# Patient Record
(demographics unavailable — no encounter records)

---

## 2024-12-12 NOTE — HISTORY OF PRESENT ILLNESS
[de-identified] : Mr. Leigh is a 55 year old male with a past medical history of HTN and HLD that presents for initial consultation for recent unprovoked R sided DVT on Xarelto 20mg.   Patient notes he developed R calf pain in 9/2022 and underwent venous duplex at [facility] on 9/17/22 revealing acute deep vein thrombosis of one right peroneal vein. Patient was started on Xarelto 20mg. Seen and evaluated by vascular sugery Dr. Neumann on 10/3/22 who recommended at least 3 months of anticoagulation. He was later seen in follow up on 12/5/22 and lower venous duplex preformed in office revealed all deep and superficial veins of the lower extremity are compressible and patent. B mode image and Doppler analysis of femoral, popliteal, tibioperoneal, and saphenous veins are within normal limits. No evidence of deep vein thrombosis or superficial phlebitis at this time. He was advised to continue anticoagulation being he did not yet have thrombophilia work up.   MTHFR compound heterozygous + Silica clotting time + 1.33 DRVVT neg Factor VIII wnl homocysteine wnl Prothrombin gene mutation - not detected   He reports this is the first time he had a blood clot. He reports daily  headaches, intermittent vertigo, severe reflux; denies bleeding  3 children alive and well  benign tumor removed from L4 2006, benign  Currently employed as    [de-identified] : Patient seen and examined today for routine follow up for the management of hx of DVT. He remains on Xaretlo 20mg daily. Tolerating well. No obvious bleeding. He continues to use compression stockings but feels they are not the right size. He did have knee pain s/p imaging and s/p cortisone and gel injections was told he will likely need surgery, nervous with hx of blood clots. He has not gone for CNY. He was started on Metformin for prediabetes by PCP and was inquiring about shot for weight loss and prediabtets. Previously went to weight management and was offered surgery and he does not want tot do this. +neuropathy to bilateral hands was also told he has carpel tunnel and going to see neuro. Denies fevers/chills, HA, dizziness, SOB, cough, CP, palpitations, abd pain, n/v/d, swelling to extremities, back pain, hematuria, BRBPR, abnormal bleeding.

## 2024-12-12 NOTE — ASSESSMENT
[FreeTextEntry1] : Mr. Leigh is a 56 year old male with a history of unprovoked DVT MTHFR heterozygous and FVL heterozygous that presents for routine follow up on Xarelto 20mg daily.   #DVT - Unprovoked DVT - Started on AC x 3 months - Remains on Xarelto - MTHFR compound heterozygous + - Silica clotting time + 1.33 - DRVVT neg - Factor VIII wnl - Homocysteine wnl - Protein c and s wnl - Prothrombin gene mutation - not detected - He underwent a venous duplex 12/5/22 which demonstrated resolution of the DVT. - Factor V leiden heterozygous which increases risk for venous thrombosis, MTHFR compound heterozygous +, slightly elevated phosphatidyl serine ab, LA +, slightly elevated cardiolipin. At this point would recommend continuing xarelto 20 mg for now. Can consider lowering to prophylactic dose however I would continue AC given these risk factors. - 8/11/23 vs and CBC reviewed; WBC 5.21, hgb 13.0, plt 230. Add anemia panel today. Continue Xarelto given risk factors. May consider ppx dose in future. For dental work or CNY will need to hold 24hour before and after. To notify office if he has planned dental work or CNY. Advised importance of compression stockings on long flights or car rides, ambulation and getting up on long carrides or flights. He is on his feet at work 16h/day. Recommend elevation of legs after work and wearing compression stockings at work. Recommend follow up with vascular Dr. Neumann as instructed. Reviewed s/s of thrombosis. - 4/24/24 vs and CBC reviewed; WBC 5.09, hgb 13.2, plt 227 d dimer wnl. Remains on Xarelto 20. Discussed lowering to ppx but rec staying on AC due to risk factors and gene mutation. He wishes to stay on 20 for now until he finishes with procedures and then after may consider going to ppx dose. Reviewed s/s of thrombosis as above and recommendations when travelling  - 12/2024 continue Xarelto 20mg daily. Does not want to lower, compression stockings ordered   #Anemia  - 8/11/23 hgb 13.0. Add anemia panel to work up today. Advised colorectal screening. Given list of [facility] providers to schedule CNY  - 4/24/24 hgb 13.2. Reviewed anemia work up from last visit +IgG lambda band. Will complete rest of MGUS work up today. Reminded about CNY has not done and states he will do. Gave names for GI specialists again  - 12/2024 stable reminded about CNY   #MGUS  - #MGUS  -We have discussed the diagnosis of Monoclonal Gammopathy. -We have reviewed the labs personally and with the patient. He does have slight anemia no evidence of anemia, hypercalcemia or renal failure from 1/23. -We discussed the criteria for MGUS and that it is a clinically asymptomatic premalignant clonal plasma cell or lymphoplasmacytic proliferative disorder however given findings we do need to rule out Multiple myeloma. -We discussed that Each year about 1% of people with MGUS go on to develop certain types of hematologic malignancies such as Multiple myeloma, Light chain amyloidosis, Waldenstrom macroglobulinemia or Lymphoma. -Given findings of monoclonal gammopathy will check Complete blood count, CMP, SPEP with ULYSSES, UPEP with ULYSSES, Urine for Bence Lucero proteins, sFLC, Quantitative Immunoglobulins, B2 microglobulin, LDH - We did discuss that although a bone marrow evaluation is indicated for all patients with MM at diagnosis, it may be deferred for persons that are clinically suspected of having MGUS with a small M-protein (less than 1.5 g/100 mL), minimal or no abnormalities in serum FLCs, and no end-organ damage. - Monitor labs q3mos M spike 0.2 last visit K:L wnl   #Neuropathy  - Seeing neuro  - Check immumoglobulins as above  - Check B12   #HTN - On antihypertensives managed by PCP   #Prediabetes/obesity - Recommend weight loss with diet and exercise - Referred to weight management team  - Patient states he saw weight management, notes reviewed. Does not want surgery  - Was started on Metformin by PCP and inquiring about injections like wegovy/ozempic. Referred to Endo   #HLD  - Remains on statin  RTC in 4-6 mos with CBC with diff, CMP, d dimer, MGUS labs and urines, b12   Case and managment discussed with Dr. Shah

## 2024-12-12 NOTE — ASSESSMENT
[FreeTextEntry1] : Mr. Leigh is a 56 year old male with a history of unprovoked DVT MTHFR heterozygous and FVL heterozygous that presents for routine follow up on Xarelto 20mg daily.   #DVT - Unprovoked DVT - Started on AC x 3 months - Remains on Xarelto - MTHFR compound heterozygous + - Silica clotting time + 1.33 - DRVVT neg - Factor VIII wnl - Homocysteine wnl - Protein c and s wnl - Prothrombin gene mutation - not detected - He underwent a venous duplex 12/5/22 which demonstrated resolution of the DVT. - Factor V leiden heterozygous which increases risk for venous thrombosis, MTHFR compound heterozygous +, slightly elevated phosphatidyl serine ab, LA +, slightly elevated cardiolipin. At this point would recommend continuing xarelto 20 mg for now. Can consider lowering to prophylactic dose however I would continue AC given these risk factors. - 8/11/23 vs and CBC reviewed; WBC 5.21, hgb 13.0, plt 230. Add anemia panel today. Continue Xarelto given risk factors. May consider ppx dose in future. For dental work or CNY will need to hold 24hour before and after. To notify office if he has planned dental work or CNY. Advised importance of compression stockings on long flights or car rides, ambulation and getting up on long carrides or flights. He is on his feet at work 16h/day. Recommend elevation of legs after work and wearing compression stockings at work. Recommend follow up with vascular Dr. Neumann as instructed. Reviewed s/s of thrombosis. - 4/24/24 vs and CBC reviewed; WBC 5.09, hgb 13.2, plt 227 d dimer wnl. Remains on Xarelto 20. Discussed lowering to ppx but rec staying on AC due to risk factors and gene mutation. He wishes to stay on 20 for now until he finishes with procedures and then after may consider going to ppx dose. Reviewed s/s of thrombosis as above and recommendations when travelling  - 12/2024 continue Xarelto 20mg daily. Does not want to lower, compression stockings ordered   #Anemia  - 8/11/23 hgb 13.0. Add anemia panel to work up today. Advised colorectal screening. Given list of [facility] providers to schedule CNY  - 4/24/24 hgb 13.2. Reviewed anemia work up from last visit +IgG lambda band. Will complete rest of MGUS work up today. Reminded about CNY has not done and states he will do. Gave names for GI specialists again  - 12/2024 stable reminded about CNY   #MGUS  - #MGUS  -We have discussed the diagnosis of Monoclonal Gammopathy. -We have reviewed the labs personally and with the patient. He does have slight anemia no evidence of anemia, hypercalcemia or renal failure from 1/23. -We discussed the criteria for MGUS and that it is a clinically asymptomatic premalignant clonal plasma cell or lymphoplasmacytic proliferative disorder however given findings we do need to rule out Multiple myeloma. -We discussed that Each year about 1% of people with MGUS go on to develop certain types of hematologic malignancies such as Multiple myeloma, Light chain amyloidosis, Waldenstrom macroglobulinemia or Lymphoma. -Given findings of monoclonal gammopathy will check Complete blood count, CMP, SPEP with ULYSSES, UPEP with ULYSSES, Urine for Bence Lucero proteins, sFLC, Quantitative Immunoglobulins, B2 microglobulin, LDH - We did discuss that although a bone marrow evaluation is indicated for all patients with MM at diagnosis, it may be deferred for persons that are clinically suspected of having MGUS with a small M-protein (less than 1.5 g/100 mL), minimal or no abnormalities in serum FLCs, and no end-organ damage. - Monitor labs q3mos M spike 0.2 last visit K:L wnl   #Neuropathy  - Seeing neuro  - Check immumoglobulins as above  - Check B12   #HTN - On antihypertensives managed by PCP   #Prediabetes/obesity - Recommend weight loss with diet and exercise - Referred to weight management team  - Patient states he saw weight management, notes reviewed. Does not want surgery  - Was started on Metformin by PCP and inquiring about injections like wegovy/ozempic. Referred to Endo   #HLD  - Remains on statin  RTC in 4-6 mos with CBC with diff, CMP, d dimer, MGUS labs and urines, b12   Case and managment discussed with Dr. Shah

## 2024-12-12 NOTE — PHYSICAL EXAM
[de-identified] : sensation intact, no swelling to lower extremities bilaterally, no calf tenderness

## 2024-12-12 NOTE — PHYSICAL EXAM
[de-identified] : sensation intact, no swelling to lower extremities bilaterally, no calf tenderness

## 2024-12-12 NOTE — HISTORY OF PRESENT ILLNESS
[de-identified] : Mr. Leigh is a 55 year old male with a past medical history of HTN and HLD that presents for initial consultation for recent unprovoked R sided DVT on Xarelto 20mg.   Patient notes he developed R calf pain in 9/2022 and underwent venous duplex at [facility] on 9/17/22 revealing acute deep vein thrombosis of one right peroneal vein. Patient was started on Xarelto 20mg. Seen and evaluated by vascular sugery Dr. Neumann on 10/3/22 who recommended at least 3 months of anticoagulation. He was later seen in follow up on 12/5/22 and lower venous duplex preformed in office revealed all deep and superficial veins of the lower extremity are compressible and patent. B mode image and Doppler analysis of femoral, popliteal, tibioperoneal, and saphenous veins are within normal limits. No evidence of deep vein thrombosis or superficial phlebitis at this time. He was advised to continue anticoagulation being he did not yet have thrombophilia work up.   MTHFR compound heterozygous + Silica clotting time + 1.33 DRVVT neg Factor VIII wnl homocysteine wnl Prothrombin gene mutation - not detected   He reports this is the first time he had a blood clot. He reports daily  headaches, intermittent vertigo, severe reflux; denies bleeding  3 children alive and well  benign tumor removed from L4 2006, benign  Currently employed as    [de-identified] : Patient seen and examined today for routine follow up for the management of hx of DVT. He remains on Xaretlo 20mg daily. Tolerating well. No obvious bleeding. He continues to use compression stockings but feels they are not the right size. He did have knee pain s/p imaging and s/p cortisone and gel injections was told he will likely need surgery, nervous with hx of blood clots. He has not gone for CNY. He was started on Metformin for prediabetes by PCP and was inquiring about shot for weight loss and prediabtets. Previously went to weight management and was offered surgery and he does not want tot do this. +neuropathy to bilateral hands was also told he has carpel tunnel and going to see neuro. Denies fevers/chills, HA, dizziness, SOB, cough, CP, palpitations, abd pain, n/v/d, swelling to extremities, back pain, hematuria, BRBPR, abnormal bleeding.

## 2025-04-24 NOTE — HISTORY OF PRESENT ILLNESS
[de-identified] : Mr. Leigh is a 55 year old male with a past medical history of HTN and HLD that presents for initial consultation for recent unprovoked R sided DVT on Xarelto 20mg.   Patient notes he developed R calf pain in 9/2022 and underwent venous duplex at [facility] on 9/17/22 revealing acute deep vein thrombosis of one right peroneal vein. Patient was started on Xarelto 20mg. Seen and evaluated by vascular sugery Dr. Neumann on 10/3/22 who recommended at least 3 months of anticoagulation. He was later seen in follow up on 12/5/22 and lower venous duplex preformed in office revealed all deep and superficial veins of the lower extremity are compressible and patent. B mode image and Doppler analysis of femoral, popliteal, tibioperoneal, and saphenous veins are within normal limits. No evidence of deep vein thrombosis or superficial phlebitis at this time. He was advised to continue anticoagulation being he did not yet have thrombophilia work up.   MTHFR compound heterozygous + Silica clotting time + 1.33 DRVVT neg Factor VIII wnl homocysteine wnl Prothrombin gene mutation - not detected   He reports this is the first time he had a blood clot. He reports daily  headaches, intermittent vertigo, severe reflux; denies bleeding  3 children alive and well  benign tumor removed from L4 2006, benign  Currently employed as    [de-identified] : Patient seen and examined today for routine follow up for the management of hx of DVT. He remains on Xaretlo 20mg daily. Tolerating well. No obvious bleeding. He continues to use compression stockings but feels they are not the right size. He did have knee pain s/p imaging and s/p cortisone and gel injections was told he will likely need surgery, nervous with hx of blood clots. He has not gone for CNY. He was started on Metformin for prediabetes by PCP and was inquiring about shot for weight loss and prediabtets. Previously went to weight management and was offered surgery and he does not want tot do this. +neuropathy to bilateral hands was also told he has carpel tunnel and going to see neuro. Denies fevers/chills, HA, dizziness, SOB, cough, CP, palpitations, abd pain, n/v/d, swelling to extremities, back pain, hematuria, BRBPR, abnormal bleeding.

## 2025-04-24 NOTE — PHYSICAL EXAM
[Fully active, able to carry on all pre-disease performance without restriction] : Status 0 - Fully active, able to carry on all pre-disease performance without restriction [Normal] : affect appropriate [de-identified] : sensation intact, no swelling to lower extremities bilaterally, no calf tenderness

## 2025-04-24 NOTE — PHYSICAL EXAM
[Fully active, able to carry on all pre-disease performance without restriction] : Status 0 - Fully active, able to carry on all pre-disease performance without restriction [Normal] : affect appropriate [de-identified] : sensation intact, no swelling to lower extremities bilaterally, no calf tenderness

## 2025-04-24 NOTE — ASSESSMENT
[FreeTextEntry1] : Mr. Leigh is a 56 year old male with a history of unprovoked DVT MTHFR heterozygous and FVL heterozygous that presents for routine follow up on Xarelto 20mg daily.   #DVT - Unprovoked DVT - Started on AC x 3 months - Remains on Xarelto - MTHFR compound heterozygous + - Silica clotting time + 1.33 - DRVVT neg - Factor VIII wnl - Homocysteine wnl - Protein c and s wnl - Prothrombin gene mutation - not detected - He underwent a venous duplex 12/5/22 which demonstrated resolution of the DVT. - Factor V leiden heterozygous which increases risk for venous thrombosis, MTHFR compound heterozygous +, slightly elevated phosphatidyl serine ab, LA +, slightly elevated cardiolipin. At this point would recommend continuing xarelto 20 mg for now. Can consider lowering to prophylactic dose however I would continue AC given these risk factors. - 8/11/23 vs and CBC reviewed; WBC 5.21, hgb 13.0, plt 230. Add anemia panel today. Continue Xarelto given risk factors. May consider ppx dose in future. For dental work or CNY will need to hold 24hour before and after. To notify office if he has planned dental work or CNY. Advised importance of compression stockings on long flights or car rides, ambulation and getting up on long carrides or flights. He is on his feet at work 16h/day. Recommend elevation of legs after work and wearing compression stockings at work. Recommend follow up with vascular Dr. Neumann as instructed. Reviewed s/s of thrombosis. - 4/24/24 vs and CBC reviewed; WBC 5.09, hgb 13.2, plt 227 d dimer wnl. Remains on Xarelto 20. Discussed lowering to ppx but rec staying on AC due to risk factors and gene mutation. He wishes to stay on 20 for now until he finishes with procedures and then after may consider going to ppx dose. Reviewed s/s of thrombosis as above and recommendations when travelling  - 12/2024 continue Xarelto 20mg daily. Does not want to lower, compression stockings ordered   #Anemia  - 8/11/23 hgb 13.0. Add anemia panel to work up today. Advised colorectal screening. Given list of [facility] providers to schedule CNY  - 4/24/24 hgb 13.2. Reviewed anemia work up from last visit +IgG lambda band. Will complete rest of MGUS work up today. Reminded about CNY has not done and states he will do. Gave names for GI specialists again  - 12/2024 stable reminded about CNY   #MGUS  - #MGUS  -We have discussed the diagnosis of Monoclonal Gammopathy. -We have reviewed the labs personally and with the patient. He does have slight anemia no evidence of anemia, hypercalcemia or renal failure from 1/23. -We discussed the criteria for MGUS and that it is a clinically asymptomatic premalignant clonal plasma cell or lymphoplasmacytic proliferative disorder however given findings we do need to rule out Multiple myeloma. -We discussed that Each year about 1% of people with MGUS go on to develop certain types of hematologic malignancies such as Multiple myeloma, Light chain amyloidosis, Waldenstrom macroglobulinemia or Lymphoma. -Given findings of monoclonal gammopathy will check Complete blood count, CMP, SPEP with ULYSSES, UPEP with ULYSSES, Urine for Bence Lucero proteins, sFLC, Quantitative Immunoglobulins, B2 microglobulin, LDH - We did discuss that although a bone marrow evaluation is indicated for all patients with MM at diagnosis, it may be deferred for persons that are clinically suspected of having MGUS with a small M-protein (less than 1.5 g/100 mL), minimal or no abnormalities in serum FLCs, and no end-organ damage. - Monitor labs q3mos M spike 0.2 last visit K:L wnl   #Neuropathy  - Seeing neuro  - Check immumoglobulins as above  - Check B12   #HTN - On antihypertensives managed by PCP   #Prediabetes/obesity - Recommend weight loss with diet and exercise - Referred to weight management team  - Patient states he saw weight management, notes reviewed. Does not want surgery  - Was started on Metformin by PCP and inquiring about injections like wegovy/ozempic. Referred to Endo   #HLD  - Remains on statin  RTC in 4-6 mos with CBC with diff, CMP, d dimer, MGUS labs and urines, b12   Case and managment discussed with Dr. Shah

## 2025-04-24 NOTE — ASSESSMENT
[FreeTextEntry1] : Mr. Leigh is a 56 year old male with a history of unprovoked DVT MTHFR heterozygous and FVL heterozygous that presents for routine follow up on Xarelto 20mg daily.   #DVT - Unprovoked DVT - Started on AC x 3 months - Remains on Xarelto - MTHFR compound heterozygous + - Silica clotting time + 1.33 - DRVVT neg - Factor VIII wnl - Homocysteine wnl - Protein c and s wnl - Prothrombin gene mutation - not detected - He underwent a venous duplex 12/5/22 which demonstrated resolution of the DVT. - Factor V leiden heterozygous which increases risk for venous thrombosis, MTHFR compound heterozygous +, slightly elevated phosphatidyl serine ab, LA +, slightly elevated cardiolipin. At this point would recommend continuing xarelto 20 mg for now. Can consider lowering to prophylactic dose however I would continue AC given these risk factors. - 8/11/23 vs and CBC reviewed; WBC 5.21, hgb 13.0, plt 230. Add anemia panel today. Continue Xarelto given risk factors. May consider ppx dose in future. For dental work or CNY will need to hold 24hour before and after. To notify office if he has planned dental work or CNY. Advised importance of compression stockings on long flights or car rides, ambulation and getting up on long carrides or flights. He is on his feet at work 16h/day. Recommend elevation of legs after work and wearing compression stockings at work. Recommend follow up with vascular Dr. Neumann as instructed. Reviewed s/s of thrombosis. - 4/24/24 vs and CBC reviewed; WBC 5.09, hgb 13.2, plt 227 d dimer wnl. Remains on Xarelto 20. Discussed lowering to ppx but rec staying on AC due to risk factors and gene mutation. He wishes to stay on 20 for now until he finishes with procedures and then after may consider going to ppx dose. Reviewed s/s of thrombosis as above and recommendations when travelling  - 12/2024 continue Xarelto 20mg daily. Does not want to lower, compression stockings ordered   #Anemia  - 8/11/23 hgb 13.0. Add anemia panel to work up today. Advised colorectal screening. Given list of [facility] providers to schedule CNY  - 4/24/24 hgb 13.2. Reviewed anemia work up from last visit +IgG lambda band. Will complete rest of MGUS work up today. Reminded about CNY has not done and states he will do. Gave names for GI specialists again  - 12/2024 stable reminded about CNY   #MGUS  - #MGUS  -We have discussed the diagnosis of Monoclonal Gammopathy. -We have reviewed the labs personally and with the patient. He does have slight anemia no evidence of anemia, hypercalcemia or renal failure from 1/23. -We discussed the criteria for MGUS and that it is a clinically asymptomatic premalignant clonal plasma cell or lymphoplasmacytic proliferative disorder however given findings we do need to rule out Multiple myeloma. -We discussed that Each year about 1% of people with MGUS go on to develop certain types of hematologic malignancies such as Multiple myeloma, Light chain amyloidosis, Waldenstrom macroglobulinemia or Lymphoma. -Given findings of monoclonal gammopathy will check Complete blood count, CMP, SPEP with ULYSSES, UPEP with ULYSSES, Urine for Bence Lucero proteins, sFLC, Quantitative Immunoglobulins, B2 microglobulin, LDH - We did discuss that although a bone marrow evaluation is indicated for all patients with MM at diagnosis, it may be deferred for persons that are clinically suspected of having MGUS with a small M-protein (less than 1.5 g/100 mL), minimal or no abnormalities in serum FLCs, and no end-organ damage. - Monitor labs q3mos M spike 0.2 last visit K:L wnl   #Neuropathy  - Seeing neuro  - Check immumoglobulins as above  - Check B12   #HTN - On antihypertensives managed by PCP   #Prediabetes/obesity - Recommend weight loss with diet and exercise - Referred to weight management team  - Patient states he saw weight management, notes reviewed. Does not want surgery  - Was started on Metformin by PCP and inquiring about injections like wegovy/ozempic. Referred to Endo   #HLD  - Remains on statin  RTC in 4-6 mos with CBC with diff, CMP, d dimer, MGUS labs and urines, b12   Case and managment discussed with Dr. Shah

## 2025-04-24 NOTE — HISTORY OF PRESENT ILLNESS
[de-identified] : Mr. Leigh is a 55 year old male with a past medical history of HTN and HLD that presents for initial consultation for recent unprovoked R sided DVT on Xarelto 20mg.   Patient notes he developed R calf pain in 9/2022 and underwent venous duplex at [facility] on 9/17/22 revealing acute deep vein thrombosis of one right peroneal vein. Patient was started on Xarelto 20mg. Seen and evaluated by vascular sugery Dr. Neumann on 10/3/22 who recommended at least 3 months of anticoagulation. He was later seen in follow up on 12/5/22 and lower venous duplex preformed in office revealed all deep and superficial veins of the lower extremity are compressible and patent. B mode image and Doppler analysis of femoral, popliteal, tibioperoneal, and saphenous veins are within normal limits. No evidence of deep vein thrombosis or superficial phlebitis at this time. He was advised to continue anticoagulation being he did not yet have thrombophilia work up.   MTHFR compound heterozygous + Silica clotting time + 1.33 DRVVT neg Factor VIII wnl homocysteine wnl Prothrombin gene mutation - not detected   He reports this is the first time he had a blood clot. He reports daily  headaches, intermittent vertigo, severe reflux; denies bleeding  3 children alive and well  benign tumor removed from L4 2006, benign  Currently employed as    [de-identified] : Patient seen and examined today for routine follow up for the management of hx of DVT. He remains on Xaretlo 20mg daily. Tolerating well. No obvious bleeding. He continues to use compression stockings but feels they are not the right size. He did have knee pain s/p imaging and s/p cortisone and gel injections was told he will likely need surgery, nervous with hx of blood clots. He has not gone for [facility]. He was started on Metformin for prediabetes by PCP and was inquiring about shot for weight loss and prediabtets. Previously went to weight management and was offered surgery and he does not want tot do this. +neuropathy to bilateral hands was also told he has carpel tunnel and going to see neuro. Denies fevers/chills, HA, dizziness, SOB, cough, CP, palpitations, abd pain, n/v/d, swelling to extremities, back pain, hematuria, BRBPR, abnormal bleeding.